# Patient Record
Sex: MALE | Race: AMERICAN INDIAN OR ALASKA NATIVE
[De-identification: names, ages, dates, MRNs, and addresses within clinical notes are randomized per-mention and may not be internally consistent; named-entity substitution may affect disease eponyms.]

---

## 2019-11-29 ENCOUNTER — HOSPITAL ENCOUNTER (EMERGENCY)
Dept: HOSPITAL 5 - ED | Age: 64
LOS: 1 days | Discharge: HOME | End: 2019-11-30
Payer: MEDICARE

## 2019-11-29 DIAGNOSIS — E11.9: ICD-10-CM

## 2019-11-29 DIAGNOSIS — Z79.899: ICD-10-CM

## 2019-11-29 DIAGNOSIS — I10: ICD-10-CM

## 2019-11-29 DIAGNOSIS — M19.90: ICD-10-CM

## 2019-11-29 DIAGNOSIS — F17.200: ICD-10-CM

## 2019-11-29 DIAGNOSIS — Z98.890: ICD-10-CM

## 2019-11-29 DIAGNOSIS — J44.1: Primary | ICD-10-CM

## 2019-11-29 DIAGNOSIS — F31.9: ICD-10-CM

## 2019-11-29 DIAGNOSIS — R10.9: ICD-10-CM

## 2019-11-29 PROCEDURE — 96365 THER/PROPH/DIAG IV INF INIT: CPT

## 2019-11-29 PROCEDURE — 81001 URINALYSIS AUTO W/SCOPE: CPT

## 2019-11-29 PROCEDURE — 93010 ELECTROCARDIOGRAM REPORT: CPT

## 2019-11-29 PROCEDURE — 74177 CT ABD & PELVIS W/CONTRAST: CPT

## 2019-11-29 PROCEDURE — 94644 CONT INHLJ TX 1ST HOUR: CPT

## 2019-11-29 PROCEDURE — 99285 EMERGENCY DEPT VISIT HI MDM: CPT

## 2019-11-29 PROCEDURE — 71275 CT ANGIOGRAPHY CHEST: CPT

## 2019-11-29 PROCEDURE — 84484 ASSAY OF TROPONIN QUANT: CPT

## 2019-11-29 PROCEDURE — 80048 BASIC METABOLIC PNL TOTAL CA: CPT

## 2019-11-29 PROCEDURE — 85379 FIBRIN DEGRADATION QUANT: CPT

## 2019-11-29 PROCEDURE — 94640 AIRWAY INHALATION TREATMENT: CPT

## 2019-11-29 PROCEDURE — 93005 ELECTROCARDIOGRAM TRACING: CPT

## 2019-11-29 PROCEDURE — 36415 COLL VENOUS BLD VENIPUNCTURE: CPT

## 2019-11-29 PROCEDURE — 83880 ASSAY OF NATRIURETIC PEPTIDE: CPT

## 2019-11-29 PROCEDURE — 85025 COMPLETE CBC W/AUTO DIFF WBC: CPT

## 2019-11-29 PROCEDURE — 96375 TX/PRO/DX INJ NEW DRUG ADDON: CPT

## 2019-11-29 PROCEDURE — 80076 HEPATIC FUNCTION PANEL: CPT

## 2019-11-29 PROCEDURE — 96368 THER/DIAG CONCURRENT INF: CPT

## 2019-11-29 PROCEDURE — 71045 X-RAY EXAM CHEST 1 VIEW: CPT

## 2019-11-30 VITALS — DIASTOLIC BLOOD PRESSURE: 65 MMHG | SYSTOLIC BLOOD PRESSURE: 122 MMHG

## 2019-11-30 LAB
ALBUMIN SERPL-MCNC: 4.4 G/DL (ref 3.9–5)
ALT SERPL-CCNC: 17 UNITS/L (ref 7–56)
BASOPHILS # (AUTO): 0 K/MM3 (ref 0–0.1)
BASOPHILS NFR BLD AUTO: 0.3 % (ref 0–1.8)
BILIRUB DIRECT SERPL-MCNC: < 0.2 MG/DL (ref 0–0.2)
BILIRUB UR QL STRIP: (no result)
BLOOD UR QL VISUAL: (no result)
BUN SERPL-MCNC: 13 MG/DL (ref 9–20)
BUN/CREAT SERPL: 13 %
CALCIUM SERPL-MCNC: 9.1 MG/DL (ref 8.4–10.2)
EOSINOPHIL # BLD AUTO: 0.1 K/MM3 (ref 0–0.4)
EOSINOPHIL NFR BLD AUTO: 1.7 % (ref 0–4.3)
HCT VFR BLD CALC: 42.5 % (ref 35.5–45.6)
HEMOLYSIS INDEX: 9
HGB BLD-MCNC: 14.2 GM/DL (ref 11.8–15.2)
LYMPHOCYTES # BLD AUTO: 3 K/MM3 (ref 1.2–5.4)
LYMPHOCYTES NFR BLD AUTO: 49.5 % (ref 13.4–35)
MCHC RBC AUTO-ENTMCNC: 33 % (ref 32–34)
MCV RBC AUTO: 103 FL (ref 84–94)
MONOCYTES # (AUTO): 0.8 K/MM3 (ref 0–0.8)
MONOCYTES % (AUTO): 12.5 % (ref 0–7.3)
MUCOUS THREADS #/AREA URNS HPF: (no result) /HPF
PH UR STRIP: 5 [PH] (ref 5–7)
PLATELET # BLD: 103 K/MM3 (ref 140–440)
PROT UR STRIP-MCNC: (no result) MG/DL
RBC # BLD AUTO: 4.14 M/MM3 (ref 3.65–5.03)
RBC #/AREA URNS HPF: < 1 /HPF (ref 0–6)
UROBILINOGEN UR-MCNC: < 2 MG/DL (ref ?–2)
WBC #/AREA URNS HPF: < 1 /HPF (ref 0–6)

## 2019-11-30 NOTE — CAT SCAN REPORT
CTA CHEST WITH IV CONTRAST



INDICATION:

Acute onset chest pain with dyspnea.



TECHNIQUE:

Axial CT images were obtained through the chest after injection of 100 mL IV contrast. 3 plane MIP re
constructions were produced. All CT scans at this location are performed using CT dose reduction for 
ALARA by means of automated exposure control. 



COMPARISON:

None available.



FINDINGS:

PULMONARY ARTERIES: No pulmonary emboli.

AORTA AND ARTERIES: No acute abnormality.

MEDIASTINUM: No mass, lymphadenopathy or other significant abnormality. The heart is normal in size w
ithout a pericardial effusion. The trachea and main bronchi are patent and normal in caliber. 

LUNGS: No suspicious consolidation, nodule or mass.  No pneumothorax or pleural effusion. Bronchial w
all thickening and mild dilatation noted bilaterally.



ADDITIONAL FINDINGS: None.



UPPER ABDOMEN: See separate CT.



BONES: No significant osseous abnormality.



IMPRESSION:

1. No CT evidence for pulmonary embolism. 

2. No acute findings. Bilateral bronchial wall thickening which could be seen with COPD type abnormal
ity.



Signer Name: Johnny Mcmullen MD 

Signed: 11/30/2019 3:10 AM

 Workstation Name: [id number]

## 2019-11-30 NOTE — EMERGENCY DEPARTMENT REPORT
ED General Adult HPI





- General


Chief complaint: Chest Pain


Stated complaint: POSS REACTION TO MEDICATION  TODD FATIGUE


Time Seen by Provider: 11/30/19 01:32


Source: patient


Mode of arrival: Ambulatory


Limitations: No Limitations





- History of Present Illness


Initial comments: 





Patient is a 64-year-old male presents emergency room with complaints of left-si

ded chest pain that began tonight.  He describes the pain as this sharp and 

stabbing sensation.  He has associated shortness of breath and productive cough 

with green sputum.  He denies any fever.  He states he has also had lower 

abdominal discomfort with nausea.  He does not report any vomiting, diarrhea, 

hematemesis, hematochezia, melena, palpitations, any other symptoms.  Patient 

states he is a current every day smoker one pack per day.  He has a history of 

hypertension and hyperlipidemia.  He states that he also has a history of 

prostate cancer and had surgery in March by a doctor at [facility]. he 

denies any chemotherapy or radiation.  He states he also had inguinal hernia 

repair in September by a doctor at [facility].  pt states that his doctor

who performed his prostate surgery put him on Bactrim 2 days ago patient states 

that he is not sure what he is on the Bactrim for he states that he stopped 

taking it due to it making him feel nauseous and that he only took 2 days of the

bactrim. pt states that he had a normal cardiac workup and a normal stress test 

in 2019 just prior to his prostate surgery in March 2019.


Severity scale (0 -10): 6





- Related Data


                                  Previous Rx's











 Medication  Instructions  Recorded  Last Taken  Type


 


Azithromycin [Zithromax TAB] 500 mg PO QDAY 7 Days  tablet 01/08/14 Unknown Rx


 


ALBUTEROL Inhaler (OR & NICU) 2 puff IH QID PRN #1 inhalation 11/30/19 Unknown 

Rx





[ProAir HFA Inhaler]    


 


Azithromycin [Zithromax TAB] 250 mg PO QDAY 4 Days #4 tablet 11/30/19 Unknown Rx


 


predniSONE [Deltasone] 40 mg PO QDAY 5 Days #10 tablet 11/30/19 Unknown Rx











                                    Allergies











Allergy/AdvReac Type Severity Reaction Status Date / Time


 


No Known Allergies Allergy   Unverified 01/08/14 00:07














ED Review of Systems


ROS: 


Stated complaint: POSS REACTION TO MEDICATION  TODD FATIGUE


Other details as noted in HPI





Comment: All other systems reviewed and negative





ED Past Medical Hx





- Past Medical History


Previous Medical History?: Yes


Hx Hypertension: Yes


Hx Diabetes: Yes


Hx Arthritis: Yes


Hx Psychiatric Treatment: Yes (bipolar)


Additional medical history: Diverticulitis





- Surgical History


Past Surgical History?: Yes


Additional Surgical History: back surgery x 2, Right inguinal hernia repair, 

Prostate, Colectomy





- Social History


Smoking Status: Current Every Day Smoker





- Medications


Home Medications: 


                                Home Medications











 Medication  Instructions  Recorded  Confirmed  Last Taken  Type


 


Azithromycin [Zithromax TAB] 500 mg PO QDAY 7 Days  tablet 01/08/14  Unknown Rx


 


ALBUTEROL Inhaler (OR & NICU) 2 puff IH QID PRN #1 inhalation 11/30/19  Unknown 

Rx





[ProAir HFA Inhaler]     


 


Azithromycin [Zithromax TAB] 250 mg PO QDAY 4 Days #4 tablet 11/30/19  Unknown 

Rx


 


predniSONE [Deltasone] 40 mg PO QDAY 5 Days #10 tablet 11/30/19  Unknown Rx














ED Physical Exam





- General


Limitations: No Limitations


General appearance: alert, in no apparent distress





- Head


Head exam: Present: atraumatic, normocephalic





- Eye


Eye exam: Present: normal appearance





- ENT


ENT exam: Present: mucous membranes moist





- Respiratory


Respiratory exam: Present: wheezes (bilaterally), rhonchi (bilaterally), 

prolonged expiratory.  Absent: respiratory distress, rales, stridor, chest wall 

tenderness, accessory muscle use, decreased breath sounds





- Cardiovascular


Cardiovascular Exam: Present: regular rate, normal rhythm, normal heart sounds. 

 Absent: systolic murmur, diastolic murmur, rubs, gallop





- GI/Abdominal


GI/Abdominal exam: Present: soft, tenderness (mild generalized), normal bowel 

sounds.  Absent: distended, guarding, rebound, rigid





- Extremities Exam


Extremities exam: Absent: pedal edema





- Neurological Exam


Neurological exam: Present: alert, oriented X3





- Psychiatric


Psychiatric exam: Present: normal affect, normal mood





- Skin


Skin exam: Present: warm, dry, intact





ED Course


                                   Vital Signs











  11/29/19 11/30/19 11/30/19





  23:56 01:30 01:59


 


Temperature 98.5 F 98.6 F 


 


Pulse Rate 110 H 89 


 


Pulse Rate [   92 H





Bilateral   





Throughout]   


 


Respiratory 22 13 16





Rate   


 


Respiratory   14





Rate [Bilateral   





Throughout]   


 


Blood Pressure 148/101  


 


Blood Pressure  127/106 





[Left]   


 


O2 Sat by Pulse 94 95 





Oximetry   














  11/30/19 11/30/19 11/30/19





  02:29 03:00 03:41


 


Temperature   


 


Pulse Rate  95 H 


 


Pulse Rate [   87





Bilateral   





Throughout]   


 


Respiratory 16 13 





Rate   


 


Respiratory   18





Rate [Bilateral   





Throughout]   


 


Blood Pressure  120/88 


 


Blood Pressure   





[Left]   


 


O2 Sat by Pulse  93 





Oximetry   














  11/30/19 11/30/19 11/30/19





  03:45 04:00 05:59


 


Temperature   98.3 F


 


Pulse Rate  92 H 78


 


Pulse Rate [   





Bilateral   





Throughout]   


 


Respiratory 16 13 16





Rate   


 


Respiratory   





Rate [Bilateral   





Throughout]   


 


Blood Pressure  113/74 


 


Blood Pressure   122/65





[Left]   


 


O2 Sat by Pulse  93 95





Oximetry   














ED Medical Decision Making





- Lab Data


Result diagrams: 


                                 11/30/19 00:35





                                 11/30/19 00:35








                                   Lab Results











  11/30/19 11/30/19 11/30/19 Range/Units





  00:35 00:35 01:44 


 


WBC  6.0    (4.5-11.0)  K/mm3


 


RBC  4.14    (3.65-5.03)  M/mm3


 


Hgb  14.2    (11.8-15.2)  gm/dl


 


Hct  42.5    (35.5-45.6)  %


 


MCV  103 H    (84-94)  fl


 


MCH  34 H    (28-32)  pg


 


MCHC  33    (32-34)  %


 


RDW  13.5    (13.2-15.2)  %


 


Plt Count  103 L    (140-440)  K/mm3


 


Lymph % (Auto)  49.5 H    (13.4-35.0)  %


 


Mono % (Auto)  12.5 H    (0.0-7.3)  %


 


Eos % (Auto)  1.7    (0.0-4.3)  %


 


Baso % (Auto)  0.3    (0.0-1.8)  %


 


Lymph #  3.0    (1.2-5.4)  K/mm3


 


Mono #  0.8    (0.0-0.8)  K/mm3


 


Eos #  0.1    (0.0-0.4)  K/mm3


 


Baso #  0.0    (0.0-0.1)  K/mm3


 


Seg Neutrophils %  36.0 L    (40.0-70.0)  %


 


Seg Neutrophils #  2.2    (1.8-7.7)  K/mm3


 


D-Dimer     (0-234)  ng/mlDDU


 


Sodium   140   (137-145)  mmol/L


 


Potassium   4.3   (3.6-5.0)  mmol/L


 


Chloride   103.7   ()  mmol/L


 


Carbon Dioxide   22   (22-30)  mmol/L


 


Anion Gap   19   mmol/L


 


BUN   13   (9-20)  mg/dL


 


Creatinine   1.0   (0.8-1.5)  mg/dL


 


Estimated GFR   > 60   ml/min


 


BUN/Creatinine Ratio   13   %


 


Glucose   138 H   ()  mg/dL


 


Calcium   9.1   (8.4-10.2)  mg/dL


 


Total Bilirubin    0.20  (0.1-1.2)  mg/dL


 


Direct Bilirubin    < 0.2  (0-0.2)  mg/dL


 


Indirect Bilirubin    0.0  mg/dL


 


AST    20  (5-40)  units/L


 


ALT    17  (7-56)  units/L


 


Alkaline Phosphatase    99  ()  units/L


 


Troponin T   < 0.010   (0.00-0.029)  ng/mL


 


NT-Pro-B Natriuret Pep    26.64  (0-900)  pg/mL


 


Total Protein    6.9  (6.3-8.2)  g/dL


 


Albumin    4.4  (3.9-5)  g/dL


 


Albumin/Globulin Ratio    1.8  %


 


Urine Color     (Yellow)  


 


Urine Turbidity     (Clear)  


 


Urine pH     (5.0-7.0)  


 


Ur Specific Gravity     (1.003-1.030)  


 


Urine Protein     (Negative)  mg/dL


 


Urine Glucose (UA)     (Negative)  mg/dL


 


Urine Ketones     (Negative)  mg/dL


 


Urine Blood     (Negative)  


 


Urine Nitrite     (Negative)  


 


Urine Bilirubin     (Negative)  


 


Urine Urobilinogen     (<2.0)  mg/dL


 


Ur Leukocyte Esterase     (Negative)  


 


Urine WBC (Auto)     (0.0-6.0)  /HPF


 


Urine RBC (Auto)     (0.0-6.0)  /HPF


 


Urine Mucus     /HPF














  11/30/19 11/30/19 11/30/19 Range/Units





  01:54 02:56 03:21 


 


WBC     (4.5-11.0)  K/mm3


 


RBC     (3.65-5.03)  M/mm3


 


Hgb     (11.8-15.2)  gm/dl


 


Hct     (35.5-45.6)  %


 


MCV     (84-94)  fl


 


MCH     (28-32)  pg


 


MCHC     (32-34)  %


 


RDW     (13.2-15.2)  %


 


Plt Count     (140-440)  K/mm3


 


Lymph % (Auto)     (13.4-35.0)  %


 


Mono % (Auto)     (0.0-7.3)  %


 


Eos % (Auto)     (0.0-4.3)  %


 


Baso % (Auto)     (0.0-1.8)  %


 


Lymph #     (1.2-5.4)  K/mm3


 


Mono #     (0.0-0.8)  K/mm3


 


Eos #     (0.0-0.4)  K/mm3


 


Baso #     (0.0-0.1)  K/mm3


 


Seg Neutrophils %     (40.0-70.0)  %


 


Seg Neutrophils #     (1.8-7.7)  K/mm3


 


D-Dimer  316.22 H    (0-234)  ng/mlDDU


 


Sodium     (137-145)  mmol/L


 


Potassium     (3.6-5.0)  mmol/L


 


Chloride     ()  mmol/L


 


Carbon Dioxide     (22-30)  mmol/L


 


Anion Gap     mmol/L


 


BUN     (9-20)  mg/dL


 


Creatinine     (0.8-1.5)  mg/dL


 


Estimated GFR     ml/min


 


BUN/Creatinine Ratio     %


 


Glucose     ()  mg/dL


 


Calcium     (8.4-10.2)  mg/dL


 


Total Bilirubin     (0.1-1.2)  mg/dL


 


Direct Bilirubin     (0-0.2)  mg/dL


 


Indirect Bilirubin     mg/dL


 


AST     (5-40)  units/L


 


ALT     (7-56)  units/L


 


Alkaline Phosphatase     ()  units/L


 


Troponin T    < 0.010  (0.00-0.029)  ng/mL


 


NT-Pro-B Natriuret Pep     (0-900)  pg/mL


 


Total Protein     (6.3-8.2)  g/dL


 


Albumin     (3.9-5)  g/dL


 


Albumin/Globulin Ratio     %


 


Urine Color   Straw   (Yellow)  


 


Urine Turbidity   Clear   (Clear)  


 


Urine pH   5.0   (5.0-7.0)  


 


Ur Specific Gravity   1.023   (1.003-1.030)  


 


Urine Protein   <15 mg/dl   (Negative)  mg/dL


 


Urine Glucose (UA)   Neg   (Negative)  mg/dL


 


Urine Ketones   Neg   (Negative)  mg/dL


 


Urine Blood   Neg   (Negative)  


 


Urine Nitrite   Neg   (Negative)  


 


Urine Bilirubin   Neg   (Negative)  


 


Urine Urobilinogen   < 2.0   (<2.0)  mg/dL


 


Ur Leukocyte Esterase   Neg   (Negative)  


 


Urine WBC (Auto)   < 1.0   (0.0-6.0)  /HPF


 


Urine RBC (Auto)   < 1.0   (0.0-6.0)  /HPF


 


Urine Mucus   Few   /HPF














- EKG Data


EKG shows normal: sinus rhythm, axis, intervals, ST-T waves


Rate: normal





- EKG Data





11/30/19 03:28


low voltage


no STEMI





- Radiology Data


Radiology results: report reviewed





CT ABDOMEN AND PELVIS WITH IV CONTRAST





INDICATION:


CP, SOB, abd pain, hx of prostate CA.





COMPARISON:


None available.





TECHNIQUE:


Axial CT images were obtained through the abdomen and pelvis after IV contrast. 

All CT scans at


this location are performed using CT dose reduction for ALARA by means of 

automated exposure


control.





FINDINGS -- ABDOMEN:


Lung Bases: No acute abnormality.


Liver: Multiple cysts.


Gallbladder: Normal. Bile Ducts: Normal.


Pancreas: Normal.


Spleen: Normal.


Adrenals: Normal.


Right Kidney and Proximal Ureter: Multiple cysts.


Left Kidney and Proximal Ureter: Multiple simple appearing cysts no 

hydronephrosis..


Stomach and Bowel: Normal.


Lymph Nodes: No significant adenopathy.


Aorta: No significant abnormality. IVC: Normal.


Additional Findings: None.





FINDINGS -- PELVIS:


Urinary Bladder and Distal Ureters: Normal.


Reproductive Organs: No acute abnormality.


Appendix: Normal. Bowel: No acute abnormality.


Free Fluid: None.


Lymph Nodes: No significant adenopathy.


Additional Findings: Small right inguinal hernia. Large ventral abdominal scar 

along the anterior


abdominal wall.





Skeletal System: No acute abnormality. Prior fusion at L4-L5.





IMPRESSION:


1. No acute process in the abdomen or pelvis.





Signer Name: Johnny Mcmullen MD


Signed: 11/30/2019 3:25 AM


Workstation Name: [id number]








Transcribed By: BC


Dictated By: Johnny Mcmullen MD


Electronically Authenticated By: Johnny Mcmullen MD


Signed Date/Time: 11/30/19 0325








CTA CHEST WITH IV CONTRAST





INDICATION:


Acute onset chest pain with dyspnea.





TECHNIQUE:


Axial CT images were obtained through the chest after injection of 100 mL IV 

contrast. 3 plane MIP


reconstructions were produced. All CT scans at this location are performed using

 CT dose reduction


for ALARA by means of automated exposure control.





COMPARISON:


None available.





FINDINGS:


PULMONARY ARTERIES: No pulmonary emboli.


AORTA AND ARTERIES: No acute abnormality.


MEDIASTINUM: No mass, lymphadenopathy or other significant abnormality. The 

heart is normal in size


without a pericardial effusion. The trachea and main bronchi are patent and 

normal in caliber.


LUNGS: No suspicious consolidation, nodule or mass. No pneumothorax or pleural 

effusion. Bronchial


wall thickening and mild dilatation noted bilaterally.





ADDITIONAL FINDINGS: None.





UPPER ABDOMEN: See separate CT.





BONES: No significant osseous abnormality.





IMPRESSION:


1. No CT evidence for pulmonary embolism.


2. No acute findings. Bilateral bronchial wall thickening which could be seen 

with COPD type


abnormality.





Signer Name: Johnny Mcmullen MD


Signed: 11/30/2019 3:10 AM


Workstation Name: [id number]








Transcribed By: BC


Dictated By: Johnny Mcmullen MD


Electronically Authenticated By: Johnny Mcmullen MD


Signed Date/Time: 11/30/19 0310











DD/DT: 11/30/19 0308


TD/TT:





CHEST 1 VIEW





INDICATION / CLINICAL INFORMATION:


Chest Pain.





COMPARISON:


None available.





FINDINGS:





SUPPORT DEVICES: None.





HEART / MEDIASTINUM: No significant abnormality.





LUNGS / PLEURA: No significant pulmonary or pleural abnormality. No p

neumothorax.





ADDITIONAL FINDINGS: No significant additional findings.





IMPRESSION:


1. No acute findings.





Signer Name: Johnny Mcmullen MD


Signed: 11/30/2019 12:40 AM


Workstation Name: [id number]








Transcribed By: BC


Dictated By: Johnny Mcmullen MD


Electronically Authenticated By: Johnny Mcmullen MD


Signed Date/Time: 11/30/19 0040














- Medical Decision Making





Patient is a 64-year-old male presents emergency room with complaints of left-

sided chest pain that began tonight.  He describes the pain as this sharp and 

stabbing sensation.  He has associated shortness of breath and productive cough 

with green sputum.  He denies any fever.  He states he has also had lower a

bdominal discomfort with nausea.  He does not report any vomiting, diarrhea, 

hematemesis, hematochezia, melena, palpitations, any other symptoms.  Patient 

states he is a current every day smoker one pack per day.  He has a history of 

hypertension and hyperlipidemia.  He states that he also has a history of 

prostate cancer and had surgery in March by a doctor at [facility]. he 

denies any chemotherapy or radiation.  He states he also had inguinal hernia 

repair in September by a doctor at [facility].  pt states that his doctor

 who performed his prostate surgery put him on Bactrim 2 days ago patient states

 that he is not sure what he is on the Bactrim for he states that he stopped 

taking it due to it making him feel nauseous and that he only took 2 days of the

 bactrim. pt states that he had a normal cardiac workup and a normal stress test

 in 2019 just prior to his prostate surgery in March 2019. initial vitals with 

mild tachycardia, mildly decreased oxygen saturation at 94%, and elevated BP. 

vitals improved upon repeat. labs significant for elevated d-dimer. no 

leukocytosis. trop is negative x2. UA is normal, no evidence of infection. on 

exam: Patient has wheezing, rhonchi, prolonged expiratory. CXR: 1. No acute 

findings. CTA chest: 1. No CT evidence for pulmonary embolism. 2. No acute 

findings. Bilateral bronchial wall thickening which could be seen with COPD type


abnormality.  After nebulizer treatment and steroids patient's breath sounds 

have improved.  Patient was ambulated in the emergency department and his oxygen

 saturation remained around 92-93% on room air.  Patient states he is feeling 

much better.  Appears patient's symptoms are related to acute on chronic COPD 

exacerbation.  pt given prescription for Zithromax, prednisone, albuterol 

inhaler.  discussed smoking cessation with patient. advised pt to please take 

medication as prescribed.  Increase your fluid intake over the next several 

days. may take your chronic pain medication you are prescribed for any 

discomfort. Follow-up with a primary care doctor in the next 2-3 days.  Return 

to the emergency room for any new or worsening symptoms.





- Differential Diagnosis


PE, PNA, URI, COPD, acute bronchitis, CHF, pleural effusion, ACS


Critical care attestation.: 


If time is entered above; I have spent that time in minutes in the direct care 

of this critically ill patient, excluding procedure time.








ED Disposition


Clinical Impression: 


 Acute exacerbation of chronic obstructive pulmonary disease, Tobacco abuse





Disposition: DC-01 TO HOME OR SELFCARE


Is pt being admited?: No


Does the pt Need Aspirin: No


Condition: Stable


Instructions:  How to Stop Smoking (ED), Acute Bronchitis (ED), Chronic 

Obstructive Pulmonary Disease (ED)


Additional Instructions: 


Please take medication as prescribed.  Increase your fluid intake over the next 

several days. may take your chronic pain medication you are prescribed for any 

discomfort. Follow-up with a primary care doctor in the next 2-3 days.  Return 

to the emergency room for any new or worsening symptoms.


Prescriptions: 


predniSONE [Deltasone] 40 mg PO QDAY 5 Days #10 tablet


ALBUTEROL Inhaler (OR & NICU) [ProAir HFA Inhaler] 2 puff IH QID PRN #1 

inhalation


 PRN Reason: Shortness Of Breath


Azithromycin [Zithromax TAB] 250 mg PO QDAY 4 Days #4 tablet


Referrals: 


[facility], [place] [Primary Care Provider] - 2-3 Days


Time of Disposition: 04:43


Print Language: ENGLISH

## 2019-11-30 NOTE — CAT SCAN REPORT
CT ABDOMEN AND PELVIS WITH IV CONTRAST



INDICATION:

CP, SOB, abd pain, hx of prostate CA.



COMPARISON:

None available.



TECHNIQUE:

Axial CT images were obtained through the abdomen and pelvis after IV contrast. All CT scans at this 
location are performed using CT dose reduction for ALARA by means of automated exposure control. 



FINDINGS -- ABDOMEN:

Lung Bases: No acute abnormality.

Liver: Multiple cysts.

Gallbladder: Normal.  Bile Ducts: Normal.

Pancreas: Normal.

Spleen: Normal.

Adrenals: Normal.

Right Kidney and Proximal Ureter: Multiple cysts.

Left Kidney and Proximal Ureter: Multiple simple appearing cysts no hydronephrosis..

Stomach and Bowel: Normal.

Lymph Nodes: No significant adenopathy.

Aorta: No significant abnormality.  IVC: Normal.

Additional Findings: None.



FINDINGS -- PELVIS:

Urinary Bladder and Distal Ureters: Normal.

Reproductive Organs: No acute abnormality.

Appendix: Normal.  Bowel: No acute abnormality.

Free Fluid: None.

Lymph Nodes: No significant adenopathy.

Additional Findings: Small right inguinal hernia. Large ventral abdominal scar along the anterior abd
ominal wall.



Skeletal System: No acute abnormality. Prior fusion at L4-L5.



IMPRESSION:

1. No acute process in the abdomen or pelvis.



Signer Name: Johnny Mcmullen MD 

Signed: 11/30/2019 3:25 AM

 Workstation Name: [id number]

## 2019-11-30 NOTE — XRAY REPORT
CHEST 1 VIEW 



INDICATION / CLINICAL INFORMATION:

Chest Pain.



COMPARISON: 

None available.



FINDINGS:



SUPPORT DEVICES: None.



HEART / MEDIASTINUM: No significant abnormality. 



LUNGS / PLEURA: No significant pulmonary or pleural abnormality. No pneumothorax. 



ADDITIONAL FINDINGS: No significant additional findings.



IMPRESSION:

1. No acute findings.



Signer Name: Johnny Mcmullen MD 

Signed: 11/30/2019 12:40 AM

 Workstation Name: [id number]